# Patient Record
Sex: FEMALE | Race: WHITE | NOT HISPANIC OR LATINO | URBAN - METROPOLITAN AREA
[De-identification: names, ages, dates, MRNs, and addresses within clinical notes are randomized per-mention and may not be internally consistent; named-entity substitution may affect disease eponyms.]

---

## 2018-03-07 ENCOUNTER — OFFICE (OUTPATIENT)
Dept: URBAN - METROPOLITAN AREA CLINIC 19 | Facility: CLINIC | Age: 73
End: 2018-03-07

## 2018-03-07 VITALS
HEART RATE: 72 BPM | SYSTOLIC BLOOD PRESSURE: 125 MMHG | WEIGHT: 110 LBS | DIASTOLIC BLOOD PRESSURE: 80 MMHG | HEIGHT: 62 IN

## 2018-03-07 DIAGNOSIS — K58.9 IRRITABLE BOWEL SYNDROME WITHOUT DIARRHEA: ICD-10-CM

## 2018-03-07 DIAGNOSIS — R07.89 OTHER CHEST PAIN: ICD-10-CM

## 2018-03-07 LAB
IMMUNOGLOBULIN A, QN, SERUM: 200 MG/DL (ref 64–422)
T-TRANSGLUTAMINASE (TTG) IGA: <2 U/ML

## 2018-03-07 PROCEDURE — 99204 OFFICE O/P NEW MOD 45 MIN: CPT | Performed by: INTERNAL MEDICINE

## 2018-03-07 RX ORDER — SODIUM PICOSULFATE, MAGNESIUM OXIDE, AND ANHYDROUS CITRIC ACID 10; 3.5; 12 MG/160ML; G/160ML; G/160ML
LIQUID ORAL
Qty: 1 | Refills: 0 | Status: COMPLETED
Start: 2018-03-07 | End: 2018-03-21

## 2018-03-07 NOTE — SERVICEHPINOTES
Mr. Tovar is a 72 yr old female, last seen in 2014 for symptoms which sound somewhat similar to current issues.  Please refer to Dr. Beard note for details. She reports that since then with “strict dietary monitoring” her symptoms had been under controlled until this past January when she had a flare of diarrhea for 2  weeks after eating at [facility].  CBC, CMP, and stool panel negative.  The symptoms had started to taper off to her baseline, especially over the past week where she has been essentially back to normal. Regarding this “normal”,  irregular bowel habits consist of intermittent episodes of postprandial urgency and diarrhea with up to 6 bowel movements a day on those flares.  someday she is completely normal. No abnormal weight loss or rectal bleeding.  This has been an issue for quite some time.  She has narrowed down to fructans, but declines doing a fructose breath test. Drinks coffee, uses milk with lactaid. No coffee creamer. No sugar substitutes, diet drinks, or gum. Eats primarily meat, seafood, lime/lemon/grapefruit, potatoes, rice, spinach.  Celiac testing in the past was negative, but did not include a TTGShe denies any abnormal weight loss or rectal bleeding.  She denies any nocturnal episodes of diarrhea.Additionally complains of "years and years" of heartburn. In middle of the night can have "painful spasms" which can last a few minutes.  Also has intermittent subxiphoid "tightness". Typically after eating. Takes tums frequently for these symptoms. Famotidine seems to make the spasms worse.  She is very hesitant to use PPI due to her history of C diff.

## 2018-03-21 ENCOUNTER — AMBULATORY SURGICAL CENTER (OUTPATIENT)
Dept: URBAN - METROPOLITAN AREA SURGERY 2 | Facility: SURGERY | Age: 73
End: 2018-03-21
Payer: COMMERCIAL

## 2018-03-21 ENCOUNTER — AMBULATORY SURGICAL CENTER (OUTPATIENT)
Dept: URBAN - METROPOLITAN AREA SURGERY 2 | Facility: SURGERY | Age: 73
End: 2018-03-21
Payer: MEDICARE

## 2018-03-21 ENCOUNTER — OFFICE (OUTPATIENT)
Dept: URBAN - METROPOLITAN AREA PATHOLOGY 22 | Facility: PATHOLOGY | Age: 73
End: 2018-03-21
Payer: COMMERCIAL

## 2018-03-21 VITALS
HEART RATE: 69 BPM | SYSTOLIC BLOOD PRESSURE: 100 MMHG | HEART RATE: 68 BPM | TEMPERATURE: 97.4 F | SYSTOLIC BLOOD PRESSURE: 100 MMHG | DIASTOLIC BLOOD PRESSURE: 68 MMHG | HEART RATE: 71 BPM | DIASTOLIC BLOOD PRESSURE: 68 MMHG | DIASTOLIC BLOOD PRESSURE: 60 MMHG | RESPIRATION RATE: 16 BRPM | DIASTOLIC BLOOD PRESSURE: 63 MMHG | DIASTOLIC BLOOD PRESSURE: 63 MMHG | SYSTOLIC BLOOD PRESSURE: 103 MMHG | OXYGEN SATURATION: 100 % | HEART RATE: 68 BPM | TEMPERATURE: 98.1 F | OXYGEN SATURATION: 100 % | HEIGHT: 63 IN | WEIGHT: 105 LBS | WEIGHT: 105 LBS | SYSTOLIC BLOOD PRESSURE: 98 MMHG | HEART RATE: 71 BPM | OXYGEN SATURATION: 98 % | HEART RATE: 67 BPM | SYSTOLIC BLOOD PRESSURE: 103 MMHG | SYSTOLIC BLOOD PRESSURE: 98 MMHG | OXYGEN SATURATION: 98 % | HEIGHT: 63 IN | HEART RATE: 69 BPM | TEMPERATURE: 98.1 F | TEMPERATURE: 97.4 F | DIASTOLIC BLOOD PRESSURE: 60 MMHG | RESPIRATION RATE: 16 BRPM | HEART RATE: 67 BPM

## 2018-03-21 DIAGNOSIS — K30 FUNCTIONAL DYSPEPSIA: ICD-10-CM

## 2018-03-21 DIAGNOSIS — Z12.11 ENCOUNTER FOR SCREENING FOR MALIGNANT NEOPLASM OF COLON: ICD-10-CM

## 2018-03-21 DIAGNOSIS — K44.9 DIAPHRAGMATIC HERNIA WITHOUT OBSTRUCTION OR GANGRENE: ICD-10-CM

## 2018-03-21 DIAGNOSIS — R07.9 CHEST PAIN, UNSPECIFIED: ICD-10-CM

## 2018-03-21 PROCEDURE — 43239 EGD BIOPSY SINGLE/MULTIPLE: CPT | Mod: 51 | Performed by: INTERNAL MEDICINE

## 2018-03-21 PROCEDURE — G8907 PT DOC NO EVENTS ON DISCHARG: HCPCS | Performed by: INTERNAL MEDICINE

## 2018-03-21 PROCEDURE — 88305 TISSUE EXAM BY PATHOLOGIST: CPT | Performed by: INTERNAL MEDICINE

## 2018-03-21 PROCEDURE — G8918 PT W/O PREOP ORDER IV AB PRO: HCPCS | Performed by: INTERNAL MEDICINE

## 2018-03-21 PROCEDURE — G0121 COLON CA SCRN NOT HI RSK IND: HCPCS | Performed by: INTERNAL MEDICINE

## 2018-03-21 RX ORDER — RIFAXIMIN 550 MG/1
TABLET ORAL
Qty: 42 | Refills: 0 | Status: ACTIVE
Start: 2018-03-21

## 2018-05-04 ENCOUNTER — OFFICE (OUTPATIENT)
Dept: URBAN - METROPOLITAN AREA CLINIC 19 | Facility: CLINIC | Age: 73
End: 2018-05-04
Payer: COMMERCIAL

## 2018-05-04 VITALS
SYSTOLIC BLOOD PRESSURE: 121 MMHG | HEIGHT: 63 IN | DIASTOLIC BLOOD PRESSURE: 77 MMHG | HEART RATE: 69 BPM | WEIGHT: 110 LBS

## 2018-05-04 DIAGNOSIS — E74.19 OTHER DISORDERS OF FRUCTOSE METABOLISM: ICD-10-CM

## 2018-05-04 DIAGNOSIS — K62.9 DISEASE OF ANUS AND RECTUM, UNSPECIFIED: ICD-10-CM

## 2018-05-04 PROCEDURE — 99213 OFFICE O/P EST LOW 20 MIN: CPT | Performed by: INTERNAL MEDICINE
